# Patient Record
Sex: MALE | NOT HISPANIC OR LATINO | Employment: FULL TIME | URBAN - METROPOLITAN AREA
[De-identification: names, ages, dates, MRNs, and addresses within clinical notes are randomized per-mention and may not be internally consistent; named-entity substitution may affect disease eponyms.]

---

## 2017-08-17 ENCOUNTER — HOSPITAL ENCOUNTER (OUTPATIENT)
Dept: VASCULAR SURGERY | Facility: CLINIC | Age: 57
Discharge: HOME | End: 2017-08-17

## 2017-08-17 ENCOUNTER — INITIAL CONSULT (OUTPATIENT)
Dept: VASCULAR SURGERY | Facility: CLINIC | Age: 57
End: 2017-08-17

## 2017-08-17 VITALS
HEIGHT: 71 IN | DIASTOLIC BLOOD PRESSURE: 88 MMHG | BODY MASS INDEX: 22.82 KG/M2 | SYSTOLIC BLOOD PRESSURE: 139 MMHG | WEIGHT: 163 LBS | TEMPERATURE: 98 F | HEART RATE: 66 BPM

## 2017-08-17 DIAGNOSIS — I73.9 PVD (PERIPHERAL VASCULAR DISEASE): ICD-10-CM

## 2017-08-17 DIAGNOSIS — I10 ESSENTIAL HYPERTENSION: ICD-10-CM

## 2017-08-17 DIAGNOSIS — F17.210 CIGARETTE SMOKER: ICD-10-CM

## 2017-08-17 DIAGNOSIS — I70.219 EXTREMITY ATHEROSCLEROSIS WITH INTERMITTENT CLAUDICATION: Primary | ICD-10-CM

## 2017-08-17 DIAGNOSIS — I73.9 PVD (PERIPHERAL VASCULAR DISEASE): Primary | ICD-10-CM

## 2017-08-17 PROCEDURE — 93923 UPR/LXTR ART STDY 3+ LVLS: CPT | Mod: 26,S$PBB,, | Performed by: SURGERY

## 2017-08-17 PROCEDURE — 3008F BODY MASS INDEX DOCD: CPT | Mod: ,,, | Performed by: SURGERY

## 2017-08-17 PROCEDURE — 99204 OFFICE O/P NEW MOD 45 MIN: CPT | Mod: S$PBB,,, | Performed by: SURGERY

## 2017-08-17 PROCEDURE — 99999 PR PBB SHADOW E&M-NEW PATIENT-LVL III: CPT | Mod: PBBFAC,,, | Performed by: SURGERY

## 2017-08-17 PROCEDURE — 93923 UPR/LXTR ART STDY 3+ LVLS: CPT | Mod: PBBFAC | Performed by: SURGERY

## 2017-08-17 PROCEDURE — 99203 OFFICE O/P NEW LOW 30 MIN: CPT | Mod: PBBFAC | Performed by: SURGERY

## 2017-08-17 RX ORDER — LISINOPRIL 40 MG/1
40 TABLET ORAL
COMMUNITY
Start: 2015-01-28 | End: 2022-08-08 | Stop reason: SDUPTHER

## 2017-08-17 RX ORDER — AMLODIPINE BESYLATE 10 MG/1
10 TABLET ORAL
COMMUNITY
Start: 2015-01-28 | End: 2022-08-08 | Stop reason: SDUPTHER

## 2017-08-17 RX ORDER — ACETAMINOPHEN 325 MG/1
650 TABLET ORAL
COMMUNITY

## 2017-08-17 RX ORDER — ASPIRIN 81 MG/1
81 TABLET ORAL DAILY
Refills: 0 | COMMUNITY
Start: 2017-08-17 | End: 2018-08-17

## 2017-08-17 NOTE — PROGRESS NOTES
Patient ID: Sid Garrett is a 57 y.o. male.    I. HISTORY     Chief Complaint: Consult  Referred by: Sonia Younger NP at [facility] Sid Garrett 57 y.o. with HTN, HLD  referred by Sonia Younger NP for right leg pain with walking.  Pt. Reports that he has right buttock to thigh and calf pain with numbness and weakness at 20-50 feet x 2-3 weeks at which time he stops walking. This interferes with his daily activities.  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  He denies rest pain. He denies a history of back pain.  He has taken ibuprofen and tylenol for the pain which does not help.     He is an avid bicycle rider, but is unable to ride right now. He reports that in June he did note that he had pain in his right thigh in June when biking which he was able to ride through.     +Smoker: 1.5 ppd x 40 years  No MI/Stroke  No personal or family history of AAA or coagulation d/o    Review of Systems   Constitution: Negative for chills, decreased appetite, fever, weakness, weight gain and weight loss.   HENT: Negative for headaches.    Eyes: Negative for blurred vision.   Cardiovascular: Positive for claudication (right buttock to thigh and calf). Negative for chest pain, leg swelling and syncope.   Respiratory: Negative for cough and shortness of breath.    Hematologic/Lymphatic: Negative for bleeding problem. Does not bruise/bleed easily.   Skin: Negative for rash.   Musculoskeletal: Negative for joint pain, muscle cramps and muscle weakness.   Gastrointestinal: Negative for abdominal pain, change in bowel habit, diarrhea, nausea and vomiting.   Neurological: Negative for numbness and paresthesias.   Psychiatric/Behavioral: Negative for altered mental status.       II. PHYSICAL EXAM     Physical Exam   Constitutional: He is oriented to person, place, and time. Vital signs are normal. He appears well-developed and well-nourished. He is cooperative.   HENT:   Head:  Normocephalic and atraumatic.   Eyes: Conjunctivae, EOM and lids are normal.   Neck: Normal range of motion. Neck supple. Carotid bruit is not present. No edema present.   Cardiovascular: Normal rate and regular rhythm.    No murmur heard.  Pulses:       Radial pulses are 2+ on the right side, and 2+ on the left side.        Femoral pulses are 2+ on the right side, and 2+ on the left side.       Popliteal pulses are 2+ on the left side.        Dorsalis pedis pulses are 1+ on the right side, and 2+ on the left side.        Posterior tibial pulses are 2+ on the left side.   Pulmonary/Chest: Effort normal and breath sounds normal. No respiratory distress. He has no wheezes.   Abdominal: Soft. Normal appearance. He exhibits no pulsatile midline mass.   Musculoskeletal: He exhibits no edema.   Neurological: He is alert and oriented to person, place, and time.   Skin: Skin is warm and dry. No erythema.   Psychiatric: He has a normal mood and affect. His behavior is normal.       III. ASSESSMENT & PLAN (MEDICAL DECISION MAKING)     1. Extremity atherosclerosis with intermittent claudication    2. Cigarette smoker    3. Essential hypertension      Imaging Results:    MELI:  R L   0.70 1.17     Right leg: waveforms suggest moderate to severe PAOD  Left Leg: waveforms suggest minimal PAOD    Assessment/Diagnosis and Plan:    57 y.o. with HTN, HLD with right buttock and thigh claudication which interferes with his daily activities and working.  He was instructed to follow up with [facility] or [facility] here in order to discuss insurance coverage options.     1. Stop smoking; Smoking cessation pamphlet given to patient  We discussed smoking cessation for approximately 5 minutes and the impact that continued smoking can have on the progression of Vascular disease. This discussion included the use of medications, patches, nicotine gum, and lifestyle modifications.  2. Exercise at least 3-5 days per week. Walk or  bike.   3. ASA 81 mg daily  4. F/u as needed    JJ Pavon, APRN, FNP-BC  Nurse Practitioner  Vascular and Endovascular Surgery      Vascular Surgery Staff  I have seen and examined the patient and reviewed the residents note. I agree with their assessment and plan.    Right hip and thigh pain consistent with inflow stenosis. Pain is limiting his ability to work.  He currently has no insurance and can not afford therapeutic intervention.  Urged him to seek financial assistance through Medicare/Medicaid.    Happy to see him back as needed    Ceci Breen MD FACS [facility]  Vascular & Endovascular Surgery

## 2017-08-17 NOTE — LETTER
August 18, 2017      BOYD Mckeon  [address] [facility] [place] [address]           [facility] V[facility]  [address]  [facility] [address]  Phone: [phone]  Fax: [phone]          Patient: Sid Garrett   MR Number: [id number]   YOB: 1960   Date of Visit: 8/17/2017       Dear Sonia Younger:    Thank you for referring Sid Garrett to me for evaluation. Attached you will find relevant portions of my assessment and plan of care.    If you have questions, please do not hesitate to call me. I look forward to following Sid Garrett along with you.    Sincerely,    Ceci Breen MD    Enclosure  CC:  No Recipients    If you would like to receive this communication electronically, please contact [email or web address] or [phone] to request more information on [facility] Link access.    For providers and/or their staff who would like to refer a patient to [facility], please contact us through our one-stop-shop provider referral line, [facility], at [phone].    If you feel you have received this communication in error or would no longer like to receive these types of communications, please e-mail [email or web address]

## 2022-08-08 ENCOUNTER — HOSPITAL ENCOUNTER (EMERGENCY)
Facility: HOSPITAL | Age: 62
Discharge: HOME | End: 2022-08-08
Attending: EMERGENCY MEDICINE
Payer: MEDICAID

## 2022-08-08 VITALS
SYSTOLIC BLOOD PRESSURE: 170 MMHG | RESPIRATION RATE: 20 BRPM | OXYGEN SATURATION: 98 % | HEART RATE: 64 BPM | DIASTOLIC BLOOD PRESSURE: 88 MMHG | HEIGHT: 71 IN | BODY MASS INDEX: 23.8 KG/M2 | WEIGHT: 170 LBS | TEMPERATURE: 98 F

## 2022-08-08 DIAGNOSIS — I10 HYPERTENSION, UNSPECIFIED TYPE: ICD-10-CM

## 2022-08-08 DIAGNOSIS — M54.50 ACUTE BILATERAL LOW BACK PAIN WITHOUT SCIATICA: Primary | ICD-10-CM

## 2022-08-08 PROCEDURE — 63600175 PHARM REV CODE 636 W HCPCS: Mod: ER | Performed by: PHYSICIAN ASSISTANT

## 2022-08-08 PROCEDURE — 96372 THER/PROPH/DIAG INJ SC/IM: CPT | Performed by: PHYSICIAN ASSISTANT

## 2022-08-08 PROCEDURE — 99284 EMERGENCY DEPT VISIT MOD MDM: CPT | Mod: ER

## 2022-08-08 PROCEDURE — 25000003 PHARM REV CODE 250: Mod: ER | Performed by: PHYSICIAN ASSISTANT

## 2022-08-08 RX ORDER — LISINOPRIL 20 MG/1
40 TABLET ORAL
Status: COMPLETED | OUTPATIENT
Start: 2022-08-08 | End: 2022-08-08

## 2022-08-08 RX ORDER — METHOCARBAMOL 750 MG/1
750 TABLET, FILM COATED ORAL 3 TIMES DAILY PRN
Qty: 15 TABLET | Refills: 0 | Status: SHIPPED | OUTPATIENT
Start: 2022-08-08 | End: 2022-08-18

## 2022-08-08 RX ORDER — AMLODIPINE BESYLATE 5 MG/1
10 TABLET ORAL
Status: COMPLETED | OUTPATIENT
Start: 2022-08-08 | End: 2022-08-08

## 2022-08-08 RX ORDER — TRIAMCINOLONE ACETONIDE 40 MG/ML
40 INJECTION, SUSPENSION INTRA-ARTICULAR; INTRAMUSCULAR
Status: COMPLETED | OUTPATIENT
Start: 2022-08-08 | End: 2022-08-08

## 2022-08-08 RX ORDER — LIDOCAINE 50 MG/G
1 PATCH TOPICAL
Status: DISCONTINUED | OUTPATIENT
Start: 2022-08-08 | End: 2022-08-08 | Stop reason: HOSPADM

## 2022-08-08 RX ORDER — LISINOPRIL 40 MG/1
40 TABLET ORAL DAILY
Qty: 30 TABLET | Refills: 0 | Status: SHIPPED | OUTPATIENT
Start: 2022-08-08

## 2022-08-08 RX ORDER — ORPHENADRINE CITRATE 30 MG/ML
30 INJECTION INTRAMUSCULAR; INTRAVENOUS
Status: COMPLETED | OUTPATIENT
Start: 2022-08-08 | End: 2022-08-08

## 2022-08-08 RX ORDER — NAPROXEN 500 MG/1
500 TABLET ORAL 2 TIMES DAILY WITH MEALS
Qty: 14 TABLET | Refills: 0 | Status: SHIPPED | OUTPATIENT
Start: 2022-08-08

## 2022-08-08 RX ORDER — AMLODIPINE BESYLATE 10 MG/1
10 TABLET ORAL DAILY
Qty: 30 TABLET | Refills: 0 | Status: SHIPPED | OUTPATIENT
Start: 2022-08-08

## 2022-08-08 RX ORDER — NAPROXEN 250 MG/1
500 TABLET ORAL
Status: COMPLETED | OUTPATIENT
Start: 2022-08-08 | End: 2022-08-08

## 2022-08-08 RX ORDER — LIDOCAINE 50 MG/G
1 PATCH TOPICAL DAILY
Qty: 7 PATCH | Refills: 0 | Status: SHIPPED | OUTPATIENT
Start: 2022-08-08

## 2022-08-08 RX ADMIN — NAPROXEN 500 MG: 250 TABLET ORAL at 12:08

## 2022-08-08 RX ADMIN — ORPHENADRINE CITRATE 30 MG: 30 INJECTION INTRAMUSCULAR; INTRAVENOUS at 12:08

## 2022-08-08 RX ADMIN — TRIAMCINOLONE ACETONIDE 40 MG: 40 INJECTION, SUSPENSION INTRA-ARTICULAR; INTRAMUSCULAR at 12:08

## 2022-08-08 RX ADMIN — LIDOCAINE 1 PATCH: 50 PATCH CUTANEOUS at 12:08

## 2022-08-08 RX ADMIN — LISINOPRIL 40 MG: 20 TABLET ORAL at 12:08

## 2022-08-08 RX ADMIN — AMLODIPINE BESYLATE 10 MG: 5 TABLET ORAL at 12:08

## 2022-08-08 NOTE — ED PROVIDER NOTES
Encounter Date: 8/8/2022       History     Chief Complaint   Patient presents with    Back Pain     C/o lower back pain since Thursday. States he pulled something while climbing into attic. Pt has not taken any medications for symptoms.      Patient is a 62-year-old male who presents for acute back pain; PMH HTN, HLD, arthritis.  Patient was working in an attic several days ago, bent over when he felt a sudden pop/pulling sensation in his right lower back.  Pain is increased over the past few days, stays in low back without radiation to BLE.  No urinary incontinence or retention.  No weakness or numbness.  No blunt trauma.  No history of back issues previously.  Hypertensive on arrival, has not taken blood pressure medicine several months.  Denies chest pain, shortness of breath, abdominal pain or flank pain.  The patients available PMH, PSH, Social History, medications, allergies, and triage vital signs were reviewed just prior to their medical evaluation.  A ten point review of systems was completed and is negative except as documented above.  Patient denies any other acute medical complaint.    Please be advised this text was dictated with [employer] software and may contain errors due to translation.           Review of patient's allergies indicates:  No Known Allergies  Past Medical History:   Diagnosis Date    Arthritis     Hyperlipidemia     Hypertension      Past Surgical History:   Procedure Laterality Date    KNEE ARTHROSCOPY Left      Family History   Problem Relation Age of Onset    Stroke Mother     Heart disease Mother     Heart disease Father     Heart failure Father     Asthma Daughter      Social History     Tobacco Use    Smoking status: Current Every Day Smoker     Packs/day: 1.00     Years: 40.00     Pack years: 40.00    Smokeless tobacco: Never Used   Substance Use Topics    Alcohol use: Yes     Alcohol/week: 28.0 standard drinks     Types: 28 Cans of beer per week     Comment: 4  beers/day    Drug use: Yes     Types: Marijuana     Review of Systems   Constitutional: Negative for chills and fever.   HENT: Negative for sore throat.    Respiratory: Negative for cough and shortness of breath.    Cardiovascular: Negative for chest pain.   Gastrointestinal: Negative for anal bleeding, nausea and vomiting.   Genitourinary: Negative for difficulty urinating, dysuria, flank pain and frequency.   Musculoskeletal: Negative for back pain.   Skin: Negative for rash.   Neurological: Negative for weakness and numbness.   Hematological: Does not bruise/bleed easily.       Physical Exam     Initial Vitals [08/08/22 1209]   BP Pulse Resp Temp SpO2   (!) 202/103 64 20 98.4 °F (36.9 °C) 98 %      MAP       --         Physical Exam    Nursing note and vitals reviewed.  Constitutional: He appears well-developed and well-nourished. He is not diaphoretic. No distress.   HENT:   Head: Normocephalic and atraumatic.   Right Ear: External ear normal.   Left Ear: External ear normal.   Mouth/Throat: Oropharynx is clear and moist.   Eyes: Conjunctivae and EOM are normal. Pupils are equal, round, and reactive to light. No scleral icterus.   Cardiovascular: Normal rate, regular rhythm and intact distal pulses.   Pulmonary/Chest: Breath sounds normal. No respiratory distress. He has no wheezes. He has no rhonchi. He has no rales.   Abdominal: Abdomen is soft. Bowel sounds are normal. There is no abdominal tenderness. There is no rebound and no guarding.   Musculoskeletal:         General: Tenderness (R paralumbar musculature) present. No edema. Normal range of motion.     Neurological: He is alert and oriented to person, place, and time. He has normal strength. No cranial nerve deficit or sensory deficit.   BLE 5/5 antalgic gait   Skin: Skin is warm and dry. Capillary refill takes less than 2 seconds.   Psychiatric: He has a normal mood and affect. His behavior is normal. Judgment and thought content normal.         ED  Course   Procedures  Labs Reviewed - No data to display       Imaging Results    None          Medications   amLODIPine tablet 10 mg (10 mg Oral Given 8/8/22 1224)   lisinopriL tablet 40 mg (40 mg Oral Given 8/8/22 1224)   orphenadrine injection 30 mg (30 mg Intramuscular Given 8/8/22 1253)   triamcinolone acetonide injection 40 mg (40 mg Intramuscular Given 8/8/22 1252)   naproxen tablet 500 mg (500 mg Oral Given 8/8/22 1251)     Medical Decision Making:   History:   Old Medical Records: I decided to obtain old medical records.  Old Records Summarized: records from clinic visits and records from previous admission(s).  Initial Assessment:   Back strain occurred with movement, reproducible on exam. NV intact  Differential Diagnosis:   Lumbar strain, myofascial pain, disc herniation  Physical exam and history taking lower clinical suspicion for cauda equina, dissection, AAA rupture  ED Management:  Given history and exam, no emergent imaging indicated. Gait improved after meds. Does need urgent f/u to PCP for BP control, meds refilled here. Course of NSAIDs/muscle relaxant for back pain. Patient agreed to plan of care and voiced understanding. Discharged in stable condition with strict ED return precautions.    Indy Campuzano PA-C  08/08/2022               ED Course as of 08/08/22 1630   Mon Aug 08, 2022   1214 BP(!): 202/103  No meds x months [MF]   1314 BP(!): 186/94 [MF]      ED Course User Index  [MF] Indy Campuzano PA-C             Clinical Impression:   Final diagnoses:  [M54.50] Acute bilateral low back pain without sciatica (Primary)  [I10] Hypertension, unspecified type          ED Disposition Condition    Discharge Stable        ED Prescriptions     Medication Sig Dispense Start Date End Date Auth. Provider    naproxen (NAPROSYN) 500 MG tablet Take 1 tablet (500 mg total) by mouth 2 (two) times daily with meals. 14 tablet 8/8/2022  Indy Campuzano PA-C    methocarbamoL (ROBAXIN) 750 MG Tab Take 1  tablet (750 mg total) by mouth 3 (three) times daily as needed. 15 tablet 8/8/2022 8/18/2022 Indy Campuzano PA-C    LIDOcaine (LIDODERM) 5 % Place 1 patch onto the skin once daily. Remove & Discard patch within 12 hours or as directed by MD 7 patch 8/8/2022  Indy Campuzano PA-C    amLODIPine (NORVASC) 10 MG tablet Take 1 tablet (10 mg total) by mouth once daily. 30 tablet 8/8/2022  Indy Campuzano PA-C    lisinopriL (PRINIVIL,ZESTRIL) 40 MG tablet Take 1 tablet (40 mg total) by mouth once daily. 30 tablet 8/8/2022  Indy Campuzano PA-C        Follow-up Information    None          Indy Campuzano PA-C  08/08/22 2961

## 2022-08-08 NOTE — DISCHARGE INSTRUCTIONS
Take blood pressure medicine daily and keep log of pressures at home, follow up with primary provider    Use medications as prescribed, Add 1000mg tylenol every 8 hours if needed for increased pain control.  Daily stretching and back strengthening exercises when improved